# Patient Record
Sex: FEMALE | Race: OTHER
[De-identification: names, ages, dates, MRNs, and addresses within clinical notes are randomized per-mention and may not be internally consistent; named-entity substitution may affect disease eponyms.]

---

## 2019-03-01 ENCOUNTER — HOSPITAL ENCOUNTER (EMERGENCY)
Dept: HOSPITAL 31 - C.ER | Age: 54
Discharge: HOME | End: 2019-03-01
Payer: COMMERCIAL

## 2019-03-01 VITALS
SYSTOLIC BLOOD PRESSURE: 118 MMHG | HEART RATE: 82 BPM | DIASTOLIC BLOOD PRESSURE: 70 MMHG | RESPIRATION RATE: 20 BRPM | TEMPERATURE: 98.8 F

## 2019-03-01 DIAGNOSIS — J11.1: Primary | ICD-10-CM

## 2019-03-01 LAB
ALBUMIN SERPL-MCNC: 4.7 G/DL (ref 3.5–5)
ALBUMIN/GLOB SERPL: 1.3 {RATIO} (ref 1–2.1)
ALT SERPL-CCNC: 13 U/L (ref 9–52)
AST SERPL-CCNC: 63 U/L (ref 14–36)
BACTERIA #/AREA URNS HPF: (no result) /[HPF]
BASE EXCESS BLDV CALC-SCNC: 0.8 MMOL/L (ref 0–2)
BASOPHILS # BLD AUTO: 0 K/UL (ref 0–0.2)
BASOPHILS NFR BLD: 0.8 % (ref 0–2)
BILIRUB UR-MCNC: NEGATIVE MG/DL
BUN SERPL-MCNC: 8 MG/DL (ref 7–17)
CALCIUM SERPL-MCNC: 9 MG/DL (ref 8.6–10.4)
EOSINOPHIL # BLD AUTO: 0 K/UL (ref 0–0.7)
EOSINOPHIL NFR BLD: 0.2 % (ref 0–4)
ERYTHROCYTE [DISTWIDTH] IN BLOOD BY AUTOMATED COUNT: 13.9 % (ref 11.5–14.5)
GFR NON-AFRICAN AMERICAN: > 60
GLUCOSE UR STRIP-MCNC: NORMAL MG/DL
HGB BLD-MCNC: 12.6 G/DL (ref 11–16)
LEUKOCYTE ESTERASE UR-ACNC: (no result) LEU/UL
LIPASE: 235 U/L (ref 23–300)
LYMPHOCYTES # BLD AUTO: 0.8 K/UL (ref 1–4.3)
LYMPHOCYTES NFR BLD AUTO: 16.7 % (ref 20–40)
MCH RBC QN AUTO: 28.6 PG (ref 27–31)
MCHC RBC AUTO-ENTMCNC: 32.8 G/DL (ref 33–37)
MCV RBC AUTO: 87.3 FL (ref 81–99)
MONOCYTES # BLD: 0.7 K/UL (ref 0–0.8)
MONOCYTES NFR BLD: 14.9 % (ref 0–10)
NEUTROPHILS # BLD: 3.1 K/UL (ref 1.8–7)
NEUTROPHILS NFR BLD AUTO: 67.4 % (ref 50–75)
NRBC BLD AUTO-RTO: 0.1 % (ref 0–2)
PCO2 BLDV: 34 MMHG (ref 40–60)
PH BLDV: 7.46 [PH] (ref 7.32–7.43)
PH UR STRIP: 6 [PH] (ref 5–8)
PLATELET # BLD: 358 K/UL (ref 130–400)
PMV BLD AUTO: 8.4 FL (ref 7.2–11.7)
PROT UR STRIP-MCNC: NEGATIVE MG/DL
RBC # BLD AUTO: 4.41 MIL/UL (ref 3.8–5.2)
RBC # UR STRIP: (no result) /UL
SP GR UR STRIP: 1.01 (ref 1–1.03)
UROBILINOGEN UR-MCNC: NORMAL MG/DL (ref 0.2–1)
VENOUS BLOOD FIO2: 21 %
VENOUS BLOOD GAS PO2: 26 MM/HG (ref 30–55)
WBC # BLD AUTO: 4.6 K/UL (ref 4.8–10.8)

## 2019-03-01 PROCEDURE — 96374 THER/PROPH/DIAG INJ IV PUSH: CPT

## 2019-03-01 PROCEDURE — 80053 COMPREHEN METABOLIC PANEL: CPT

## 2019-03-01 PROCEDURE — 82803 BLOOD GASES ANY COMBINATION: CPT

## 2019-03-01 PROCEDURE — 99284 EMERGENCY DEPT VISIT MOD MDM: CPT

## 2019-03-01 PROCEDURE — 87430 STREP A AG IA: CPT

## 2019-03-01 PROCEDURE — 83690 ASSAY OF LIPASE: CPT

## 2019-03-01 PROCEDURE — 71046 X-RAY EXAM CHEST 2 VIEWS: CPT

## 2019-03-01 PROCEDURE — 82550 ASSAY OF CK (CPK): CPT

## 2019-03-01 PROCEDURE — 87040 BLOOD CULTURE FOR BACTERIA: CPT

## 2019-03-01 PROCEDURE — 87804 INFLUENZA ASSAY W/OPTIC: CPT

## 2019-03-01 PROCEDURE — 87070 CULTURE OTHR SPECIMN AEROBIC: CPT

## 2019-03-01 PROCEDURE — 85025 COMPLETE CBC W/AUTO DIFF WBC: CPT

## 2019-03-01 PROCEDURE — 81001 URINALYSIS AUTO W/SCOPE: CPT

## 2019-03-01 NOTE — C.PDOC
History Of Present Illness


53 year old female with no past-medical history presents to the emergency 

department with complaints of sore throat, cough, body aches, generalized 

weakness and chills for the last 3 days. Patient reports that she was seen by 

Dr. Jorje Hidalgo yesterday who prescribed her Z-Pack and Promethazine. Patient 

reports that she has taken the medication but reports feeling weaker with 

worsening body aches. Patient denies trauma, falls, rashes, taking any Statins, 

neck stiffness, abdominal pain, shortness of breath, and urinary complaints. No 

constipation or diarrhea, no dark or bloody stool. No change in phonation. 

Patient reports that she is able to eat and drink normally. 


Time Seen by Provider: 19 20:00


Chief Complaint (Nursing): Flu-like Symptoms


History Per: Patient


History/Exam Limitations: no limitations


Onset/Duration Of Symptoms: Days (3)


Current Symptoms Are (Timing): Still Present


Location Of Pain: Throat, Diffuse Myalgias


Associated Symptoms: Chills, Sore Throat, Cough, Myalgias, Nasal Congestion





Past Medical History


Reviewed: Historical Data, Nursing Documentation, Vital Signs


Vital Signs: 





                                Last Vital Signs











Temp  100.2 F H  19 19:45


 


Pulse  91 H  19 19:45


 


Resp  16   19 19:45


 


BP  112/77   19 19:45


 


Pulse Ox  97   19 19:45














- Medical History


PMH: No Chronic Diseases


Surgical History: Cholecystectomy (14)





- [facility] Procedures











ESOPHAGOGASTRODUODENOSCOPY [EGD] W/CLOSED BIOPSY (14)








Family History: States: No Known Family Hx





- Social History


Hx Tobacco Use: No


Hx Alcohol Use: No


Hx Substance Use: No





- Immunization History


Hx Tetanus Toxoid Vaccination: No


Hx Influenza Vaccination: No


Hx Pneumococcal Vaccination: No





Review Of Systems


Constitutional: Positive for: Chills, Weakness.  Negative for: Fever


Eyes: Negative for: Pain, Vision Change


ENT: Positive for: Throat Pain.  Negative for: Ear Pain, Ear Discharge, Nose 

Pain, Nose Discharge, Nose Congestion, Mouth Pain, Mouth Swelling, Throat 

Swelling


Cardiovascular: Negative for: Chest Pain, Palpitations, Orthopnea


Respiratory: Positive for: Cough.  Negative for: Shortness of Breath, Pleuritic 

Pain


Gastrointestinal: Negative for: Nausea, Vomiting, Abdominal Pain, Diarrhea, 

Constipation, Melena, Hematochezia


Genitourinary: Negative for: Dysuria, Frequency, Incontinence, Hematuria, 

Vaginal Discharge, Vaginal Bleeding


Musculoskeletal: Positive for: Other (body aches).  Negative for: Neck Pain, 

Shoulder Pain, Arm Pain


Skin: Negative for: Rash, Lesions


Neurological: Positive for: Weakness (diffuse).  Negative for: Numbness, 

Incoordination, Confusion, Seizures, Altered Mental Status, Headache, Dizziness


Psych: Negative for: Anxiety, Depression





Physical Exam





- Physical Exam


Appears: Well, Non-toxic, No Acute Distress


Skin: Normal Color, Warm, Dry


Head: Atraumatic, Normacephalic


Eye(s): bilateral: Normal Inspection, PERRL, EOMI


Ear(s): Bilateral: Normal


Nose: Normal, No Flaring, No Discharge


Oral Mucosa: Moist


Tongue: Normal Appearing, No Swelling, No Lesions


Lips: Normal Appearing, No Swelling, No Contusion


Gingiva: Normal Appearing, No Erythema, No Ulceration


Throat: Normal, No Erythema, No Exudate


Neck: Normal, Normal ROM, Supple, Other (no meningeal signs)


Chest: Symmetrical, No Tenderness


Cardiovascular: Rhythm Regular, No Murmur


Respiratory: Normal Breath Sounds, No Rales, No Rhonchi, No Wheezing


Gastrointestinal/Abdominal: Normal Exam, Soft, No Tenderness, No Guarding, No 

Rebound


Back: Normal Inspection, No CVA Tenderness


Extremity: Normal ROM, No Tenderness


Extremity: Bilateral: Atraumatic


Neurological/Psych: Oriented x3, Normal Speech, Normal Cognition, No Cerebellar 

Signs, Normal Motor, Normal Sensation


Gait: Steady





ED Course And Treatment





- Laboratory Results


Result Diagrams: 


                                 19 20:43





                                 19 20:43


O2 Sat by Pulse Oximetry: 97 (RA)


Pulse Ox Interpretation: Normal





Medical Decision Making


Medical Decision Makin yr old F w/ recent treatment of bronchitis w/ promethazine and z pack p/w 

diffuse weakness, sore throat, body aches, nasal congestion. On exam nasal exam 

w/ mild rhinorrhea. TM unremarkable, mastoid unremarkable. No weakness noted on 

exam, 5/5 strength and no FND. No meningeal signs. No orophyrangeal abnl. No 

difficulty swallowing / hot potato voice or change in phonation. 





Plan:


VBG


Chemistry


CBC


CXR


Motrin 400mg PO


Blood Culture


POC Urine Pregnacy


Influenza A B Serology


Urinalysis





Impression:


Flu vs. Viral URI vs. Pneumonia





2217


per my read


CXR: No acute disease or infiltrates.


Labs largely unremarkable outside of mildly increased CK and +FLU A


no meningeal signs


pt notes symptompatic improvement. Walking in NAD 


Clear for d/c home with return indications and f/u. Pt and  agreeable to 

plan. 








 





Disposition





- Disposition


Referrals: 


Jorje Hidalgo MD [Medical Doctor] - 


[facility] [Outside]


[facility] [Outside]


[facility] [Outside]


Disposition: HOME/ ROUTINE


Disposition Time: 22:17


Condition: GOOD


Additional Instructions: 





YUMIKO STAFFORD, thank you for letting us take care of you today. Your provider 

was Tc Reinoso and you were treated for WEAKNESS/COLD. The emergency medical care

 you received today was directed at your acute symptoms. If you were prescribed 

any medication, please fill it and take as directed. It may take several days 

for your symptoms to resolve. Return to the Emergency Department if your 

symptoms worsen, do not improve, or if you have any other problems.





Please contact your doctor or call one of the physicians/clinics you have been 

referred to that are listed on the Patient Visit Information form that is 

included in your discharge packet. Bring any paperwork you were given at 

discharge with you along with any medications you are taking to your follow up 

visit. Our treatment cannot replace ongoing medical care by a primary care 

provider outside of the emergency department.





Thank you for allowing the [facility] team to be part of your care today.








If you had an X-Ray or CT scan: A Radiologist will review the ED reading if any 

change in treatment is needed we will contact you.***





If you had a blood, urine, or wound culture: It will take several days for the 

results, if any change in treatment is needed we will contact you.***





If you had an STI test: It will take 48 hours for the results. Please call after

 1 week if you have not heard back.***


Prescriptions: 


Oseltamivir Cap [Tamiflu] 75 mg PO BID 5 Days #10 cap


Instructions:  Flu, Adult (DC)


Forms:  [facility] (English)





- Clinical Impression


Clinical Impression: 


 Influenza








- Scribe Statement


The provider has reviewed the documentation as recorded by the Scribe (Ascencion oVgel)


Provider Attestation: 


All medical record entries made by the Scribe were at my direction and 

personally dictated by me. I have reviewed the chart and agree that the record 

accurately reflects my personal performance of the history, physical exam, 

medical decision making, and the department course for this patient. I have also

 personally directed, reviewed, and agree with the discharge instructions and 

disposition.

## 2019-03-02 VITALS — OXYGEN SATURATION: 97 %

## 2019-03-02 NOTE — RAD
Date of service: 



03/01/2019



HISTORY:

Cough



COMPARISON:

Comparison chest dated 06/15/2014



TECHNIQUE:

Chest PA and lateral



FINDINGS:



LUNGS:

Increased and coarsened interstitial markings possibly secondary to 

reactive-inflammatory airway disease or viral illness.



PLEURA:

No significant pleural effusion identified. No pneumothorax apparent.



CARDIOVASCULAR:

No aortic atherosclerotic calcification present.



Normal cardiac size. No pulmonary vascular congestion. 



OSSEOUS STRUCTURES:

No significant abnormalities.



VISUALIZED UPPER ABDOMEN:

Normal.



OTHER FINDINGS:

None.



IMPRESSION:

Increased and coarsened interstitial markings possibly secondary to 

reactive-inflammatory airway disease or viral illness.

## 2024-02-27 ENCOUNTER — NEW PATIENT COMPREHENSIVE (OUTPATIENT)
Dept: URBAN - METROPOLITAN AREA CLINIC 110 | Facility: CLINIC | Age: 59
End: 2024-02-27

## 2024-02-27 DIAGNOSIS — H04.123: ICD-10-CM

## 2024-02-27 DIAGNOSIS — H52.4: ICD-10-CM

## 2024-02-27 PROCEDURE — 92015 DETERMINE REFRACTIVE STATE: CPT

## 2024-02-27 PROCEDURE — 92004 COMPRE OPH EXAM NEW PT 1/>: CPT

## 2024-02-27 ASSESSMENT — VISUAL ACUITY
OU_CC: J1+
OD_CC: 20/20-3
OS_CC: 20/25

## 2024-02-27 ASSESSMENT — TONOMETRY
OS_IOP_MMHG: 13
OD_IOP_MMHG: 17